# Patient Record
Sex: FEMALE | Race: OTHER | NOT HISPANIC OR LATINO | URBAN - METROPOLITAN AREA
[De-identification: names, ages, dates, MRNs, and addresses within clinical notes are randomized per-mention and may not be internally consistent; named-entity substitution may affect disease eponyms.]

---

## 2017-01-02 ENCOUNTER — INPATIENT (INPATIENT)
Facility: HOSPITAL | Age: 77
LOS: 0 days | Discharge: HOME | End: 2017-01-03
Attending: HOSPITALIST | Admitting: HOSPITALIST
Payer: MEDICARE

## 2017-01-02 VITALS
TEMPERATURE: 97 F | HEART RATE: 71 BPM | RESPIRATION RATE: 20 BRPM | SYSTOLIC BLOOD PRESSURE: 167 MMHG | DIASTOLIC BLOOD PRESSURE: 84 MMHG

## 2017-01-02 DIAGNOSIS — M54.42 LUMBAGO WITH SCIATICA, LEFT SIDE: ICD-10-CM

## 2017-01-02 DIAGNOSIS — M54.5 LOW BACK PAIN: ICD-10-CM

## 2017-01-02 DIAGNOSIS — E78.5 HYPERLIPIDEMIA, UNSPECIFIED: ICD-10-CM

## 2017-01-02 DIAGNOSIS — I10 ESSENTIAL (PRIMARY) HYPERTENSION: ICD-10-CM

## 2017-01-02 DIAGNOSIS — E03.9 HYPOTHYROIDISM, UNSPECIFIED: ICD-10-CM

## 2017-01-02 DIAGNOSIS — Z41.8 ENCOUNTER FOR OTHER PROCEDURES FOR PURPOSES OTHER THAN REMEDYING HEALTH STATE: ICD-10-CM

## 2017-01-02 LAB
BASOPHILS # BLD AUTO: 0.02 K/UL — SIGNIFICANT CHANGE UP (ref 0–0.2)
BASOPHILS NFR BLD AUTO: 0.3 % — SIGNIFICANT CHANGE UP (ref 0–2)
BUN SERPL-MCNC: 28 MG/DL — HIGH (ref 7–23)
CALCIUM SERPL-MCNC: 9.1 MG/DL — SIGNIFICANT CHANGE UP (ref 8.4–10.5)
CHLORIDE SERPL-SCNC: 101 MMOL/L — SIGNIFICANT CHANGE UP (ref 98–107)
CK SERPL-CCNC: 219 U/L — HIGH (ref 25–170)
CO2 SERPL-SCNC: 26 MMOL/L — SIGNIFICANT CHANGE UP (ref 22–31)
CREAT SERPL-MCNC: 0.9 MG/DL — SIGNIFICANT CHANGE UP (ref 0.5–1.3)
EOSINOPHIL # BLD AUTO: 0.2 K/UL — SIGNIFICANT CHANGE UP (ref 0–0.5)
EOSINOPHIL NFR BLD AUTO: 3.2 % — SIGNIFICANT CHANGE UP (ref 0–6)
GLUCOSE SERPL-MCNC: 143 MG/DL — HIGH (ref 70–99)
HCT VFR BLD CALC: 35 % — SIGNIFICANT CHANGE UP (ref 34.5–45)
HGB BLD-MCNC: 11.4 G/DL — LOW (ref 11.5–15.5)
IMM GRANULOCYTES NFR BLD AUTO: 0.2 % — SIGNIFICANT CHANGE UP (ref 0–1.5)
LYMPHOCYTES # BLD AUTO: 2.32 K/UL — SIGNIFICANT CHANGE UP (ref 1–3.3)
LYMPHOCYTES # BLD AUTO: 37.6 % — SIGNIFICANT CHANGE UP (ref 13–44)
MCHC RBC-ENTMCNC: 31.1 PG — SIGNIFICANT CHANGE UP (ref 27–34)
MCHC RBC-ENTMCNC: 32.6 % — SIGNIFICANT CHANGE UP (ref 32–36)
MCV RBC AUTO: 95.6 FL — SIGNIFICANT CHANGE UP (ref 80–100)
MONOCYTES # BLD AUTO: 0.54 K/UL — SIGNIFICANT CHANGE UP (ref 0–0.9)
MONOCYTES NFR BLD AUTO: 8.8 % — SIGNIFICANT CHANGE UP (ref 2–14)
NEUTROPHILS # BLD AUTO: 3.08 K/UL — SIGNIFICANT CHANGE UP (ref 1.8–7.4)
NEUTROPHILS NFR BLD AUTO: 49.9 % — SIGNIFICANT CHANGE UP (ref 43–77)
PLATELET # BLD AUTO: 147 K/UL — LOW (ref 150–400)
PMV BLD: 10.8 FL — SIGNIFICANT CHANGE UP (ref 7–13)
POTASSIUM SERPL-MCNC: 4.8 MMOL/L — SIGNIFICANT CHANGE UP (ref 3.5–5.3)
POTASSIUM SERPL-SCNC: 4.8 MMOL/L — SIGNIFICANT CHANGE UP (ref 3.5–5.3)
RBC # BLD: 3.66 M/UL — LOW (ref 3.8–5.2)
RBC # FLD: 12.8 % — SIGNIFICANT CHANGE UP (ref 10.3–14.5)
SODIUM SERPL-SCNC: 143 MMOL/L — SIGNIFICANT CHANGE UP (ref 135–145)
WBC # BLD: 6.17 K/UL — SIGNIFICANT CHANGE UP (ref 3.8–10.5)
WBC # FLD AUTO: 6.17 K/UL — SIGNIFICANT CHANGE UP (ref 3.8–10.5)

## 2017-01-02 PROCEDURE — 99223 1ST HOSP IP/OBS HIGH 75: CPT

## 2017-01-02 PROCEDURE — 72170 X-RAY EXAM OF PELVIS: CPT | Mod: 26

## 2017-01-02 RX ORDER — LEVOTHYROXINE SODIUM 125 MCG
75 TABLET ORAL DAILY
Qty: 0 | Refills: 0 | Status: DISCONTINUED | OUTPATIENT
Start: 2017-01-02 | End: 2017-01-03

## 2017-01-02 RX ORDER — ENOXAPARIN SODIUM 100 MG/ML
40 INJECTION SUBCUTANEOUS EVERY 24 HOURS
Qty: 0 | Refills: 0 | Status: DISCONTINUED | OUTPATIENT
Start: 2017-01-02 | End: 2017-01-03

## 2017-01-02 RX ORDER — KETOROLAC TROMETHAMINE 30 MG/ML
30 SYRINGE (ML) INJECTION ONCE
Qty: 0 | Refills: 0 | Status: DISCONTINUED | OUTPATIENT
Start: 2017-01-02 | End: 2017-01-02

## 2017-01-02 RX ORDER — TRAMADOL HYDROCHLORIDE 50 MG/1
50 TABLET ORAL EVERY 6 HOURS
Qty: 0 | Refills: 0 | Status: DISCONTINUED | OUTPATIENT
Start: 2017-01-02 | End: 2017-01-03

## 2017-01-02 RX ORDER — KETOROLAC TROMETHAMINE 30 MG/ML
60 SYRINGE (ML) INJECTION ONCE
Qty: 0 | Refills: 0 | Status: DISCONTINUED | OUTPATIENT
Start: 2017-01-02 | End: 2017-01-02

## 2017-01-02 RX ORDER — TRAMADOL HYDROCHLORIDE 50 MG/1
50 TABLET ORAL ONCE
Qty: 0 | Refills: 0 | Status: DISCONTINUED | OUTPATIENT
Start: 2017-01-02 | End: 2017-01-02

## 2017-01-02 RX ORDER — IBUPROFEN 200 MG
400 TABLET ORAL EVERY 6 HOURS
Qty: 0 | Refills: 0 | Status: DISCONTINUED | OUTPATIENT
Start: 2017-01-02 | End: 2017-01-03

## 2017-01-02 RX ORDER — ATORVASTATIN CALCIUM 80 MG/1
10 TABLET, FILM COATED ORAL AT BEDTIME
Qty: 0 | Refills: 0 | Status: DISCONTINUED | OUTPATIENT
Start: 2017-01-02 | End: 2017-01-03

## 2017-01-02 RX ADMIN — ENOXAPARIN SODIUM 40 MILLIGRAM(S): 100 INJECTION SUBCUTANEOUS at 22:39

## 2017-01-02 RX ADMIN — Medication 60 MILLIGRAM(S): at 19:39

## 2017-01-02 RX ADMIN — ATORVASTATIN CALCIUM 10 MILLIGRAM(S): 80 TABLET, FILM COATED ORAL at 22:39

## 2017-01-02 RX ADMIN — TRAMADOL HYDROCHLORIDE 50 MILLIGRAM(S): 50 TABLET ORAL at 17:53

## 2017-01-02 RX ADMIN — TRAMADOL HYDROCHLORIDE 50 MILLIGRAM(S): 50 TABLET ORAL at 19:39

## 2017-01-02 RX ADMIN — Medication 60 MILLIGRAM(S): at 18:14

## 2017-01-02 NOTE — ED PROVIDER NOTE - PHYSICAL EXAMINATION
No SI joint or back tenderness to palpation; negative CVAT b/l; negative straight leg raise test b/l; tenderness to palpation of b/l posterior thighs and buttocks without visible signs of trauma; kyphosis present. Patient able to ambulate with cane but with difficulty and pain

## 2017-01-02 NOTE — ED PROVIDER NOTE - PROGRESS NOTE DETAILS
FLORI Note: 75 yo female with PMH of HTN, HLD, chronic back and leg pain on tramadol and NSAIDS presents to the ED with one week of worsening back and leg pain and recent fall.  PE appears comfortable in bed, moving all extremities well, S1S2 RRR no murmur gallops rubs, CTABL,  on right side at posterior hip.  + straight leg raise.  Ambulates well with some antalgic fearful gait.  - follow up xrays, pain control, re-eval.  likely dispo home. MAR called by Dr. Ya before sign out.

## 2017-01-02 NOTE — ED PROVIDER NOTE - PMH
<<----- Click to add NO pertinent Past Medical History Back pain    HLD (hyperlipidemia)    HTN (hypertension)

## 2017-01-02 NOTE — ED PROVIDER NOTE - ATTENDING CONTRIBUTION TO CARE
I performed a history and physical exam of the patient and discussed their management with the resident. I reviewed the resident's note and agree with the documented findings and plan of care. My medical decison making and observations are found above.  Normal neuro exam. thigh pain after fall. pt can walk.  treat pain xray hips

## 2017-01-02 NOTE — H&P ADULT. - PROBLEM SELECTOR PLAN 2
- Monitor BP  - Continue home meds - Monitor BP overnight  - Continue home meds in am.  Patient's personal list not same as electronic record. Will need to confirm with pcp

## 2017-01-02 NOTE — H&P ADULT. - HISTORY OF PRESENT ILLNESS
77 yo F with h/o HTN, HLD, hypothyroid, obese with chronic back pain p/w acutely worsened back and hip pain. Pt has had chronic back and hip pain for many years but it has been getting worse over the past two weeks and especially bad this morning when getting out of bed and pt could not stand on her own due to pain- states that she slid to the floor without and LOC or head trauma. She describes her back pain as sharp radiating down both legs, also has "pulling and sharp pain" in buttocks and hip which is worse with ambulation. She has been tapering down on her tramadol use on recommendation from her PCP but states that pain was still not controlled after taking tramadol. She denies any recent trauma or lifting heavy objects. No fevers or weight loss. She has had b/l knee replacements for osteoarthritis. She also mentions gaining weight      ED VS: 167/84  71  97.4  20 100% on RA  Pt was given valium 2mg PO, tramadol 60mg IM, percocet, tramadol 50mg PO

## 2017-01-02 NOTE — ED PROVIDER NOTE - MEDICAL DECISION MAKING DETAILS
75 yo female with PMH of HTN, HLD, chronic back and leg pain on tramadol and NSAIDS at home presents to the ED with one week of worsening back and leg pain since her doctor tapered her off of tramadol. Plan: pelvic xray, pain control, reevaluation 77 yo female with PMH of HTN, HLD, chronic back and leg pain on tramadol and NSAIDS at home presents to the ED with one week of worsening back and leg pain since her doctor tapered her off of tramadol. Plan: pelvic xray, pain control, reevaluation  Manuelito: no neuro changes. fall not syncopal or presyncopal.  no head trauma. will xray for trauma. discuss pain control with patient.

## 2017-01-02 NOTE — H&P ADULT. - PROBLEM SELECTOR PLAN 1
- Back pain likely sciatica  - Patient also having b/l hip pain likely due to osteoarthritis  - Pain control- NSAID, tramadol prn  - Consult PT, likely to benefit as out pt as well  - orthopedic surgery consult  -  on weight reduction - Back pain likely sciatica  - Patient also having b/l hip pain likely due to osteoarthritis  - Pain control- morphine prn- pt states that percocet and tramadol were not effective  - Consult PT, likely to benefit as out pt as well  - Consider pain management consult  - orthopedic surgery consult

## 2017-01-02 NOTE — ED PROVIDER NOTE - OBJECTIVE STATEMENT
75 yo female with PMH of HTN, HLD, chronic back and leg pain on tramadol and NSAIDS at home presents to the ED with one week of worsening back and leg pain since her doctor tapered her off of tramadol. States she had a witnessed mechanical fall from standing height at home today due to lower extremity pain and weakness. Denies LOC, head or neck trauma, n/v, fever, chills, paresthesias or numbness. Moving all extremities spontaneously, able to ambulate with her cane but with difficulty.

## 2017-01-03 VITALS
DIASTOLIC BLOOD PRESSURE: 83 MMHG | OXYGEN SATURATION: 100 % | HEART RATE: 76 BPM | SYSTOLIC BLOOD PRESSURE: 158 MMHG | TEMPERATURE: 98 F | RESPIRATION RATE: 19 BRPM

## 2017-01-03 PROCEDURE — 99239 HOSP IP/OBS DSCHRG MGMT >30: CPT

## 2017-01-03 RX ORDER — MORPHINE SULFATE 50 MG/1
2 CAPSULE, EXTENDED RELEASE ORAL EVERY 4 HOURS
Qty: 0 | Refills: 0 | Status: DISCONTINUED | OUTPATIENT
Start: 2017-01-03 | End: 2017-01-03

## 2017-01-03 RX ORDER — ACETAMINOPHEN 500 MG
650 TABLET ORAL EVERY 6 HOURS
Qty: 0 | Refills: 0 | Status: DISCONTINUED | OUTPATIENT
Start: 2017-01-03 | End: 2017-01-03

## 2017-01-03 RX ORDER — DOCUSATE SODIUM 100 MG
1 CAPSULE ORAL
Qty: 90 | Refills: 0 | OUTPATIENT
Start: 2017-01-03 | End: 2017-02-02

## 2017-01-03 RX ORDER — POLYETHYLENE GLYCOL 3350 17 G/17G
17 POWDER, FOR SOLUTION ORAL DAILY
Qty: 0 | Refills: 0 | Status: DISCONTINUED | OUTPATIENT
Start: 2017-01-03 | End: 2017-01-03

## 2017-01-03 RX ORDER — POLYETHYLENE GLYCOL 3350 17 G/17G
17 POWDER, FOR SOLUTION ORAL
Qty: 510 | Refills: 0 | OUTPATIENT
Start: 2017-01-03 | End: 2017-02-02

## 2017-01-03 RX ORDER — OXYCODONE HYDROCHLORIDE 5 MG/1
5 TABLET ORAL EVERY 6 HOURS
Qty: 0 | Refills: 0 | Status: DISCONTINUED | OUTPATIENT
Start: 2017-01-03 | End: 2017-01-03

## 2017-01-03 RX ORDER — DOCUSATE SODIUM 100 MG
100 CAPSULE ORAL THREE TIMES A DAY
Qty: 0 | Refills: 0 | Status: DISCONTINUED | OUTPATIENT
Start: 2017-01-03 | End: 2017-01-03

## 2017-01-03 RX ORDER — MORPHINE SULFATE 50 MG/1
4 CAPSULE, EXTENDED RELEASE ORAL EVERY 4 HOURS
Qty: 0 | Refills: 0 | Status: DISCONTINUED | OUTPATIENT
Start: 2017-01-03 | End: 2017-01-03

## 2017-01-03 RX ORDER — LOSARTAN POTASSIUM 100 MG/1
100 TABLET, FILM COATED ORAL ONCE
Qty: 0 | Refills: 0 | Status: COMPLETED | OUTPATIENT
Start: 2017-01-03 | End: 2017-01-03

## 2017-01-03 RX ADMIN — Medication 75 MICROGRAM(S): at 05:25

## 2017-01-03 RX ADMIN — Medication 100 MILLIGRAM(S): at 13:04

## 2017-01-03 RX ADMIN — LOSARTAN POTASSIUM 100 MILLIGRAM(S): 100 TABLET, FILM COATED ORAL at 13:04

## 2017-01-03 RX ADMIN — OXYCODONE HYDROCHLORIDE 5 MILLIGRAM(S): 5 TABLET ORAL at 18:45

## 2017-01-03 RX ADMIN — POLYETHYLENE GLYCOL 3350 17 GRAM(S): 17 POWDER, FOR SOLUTION ORAL at 13:05

## 2017-01-03 NOTE — DISCHARGE NOTE ADULT - PATIENT PORTAL LINK FT
“You can access the Follow[facility] Patient Portal, offered by [facility], by registering with the following website: [email or web address]”

## 2017-01-03 NOTE — DISCHARGE NOTE ADULT - CONDITIONS AT DISCHARGE
Patient is alert and orientedx4,vss no c/o pain no respiratory distress. Discharge home, discharge instructions reviewed with patient , copies given Verbalized understanding of discharge instructions and follow-up care

## 2017-01-03 NOTE — DISCHARGE NOTE ADULT - PLAN OF CARE
pain control Continue pain medications as needed.  Please follow up with your PCP within 1 week.   Call your doctor if pain worsens.

## 2017-01-03 NOTE — DISCHARGE NOTE ADULT - MEDICATION SUMMARY - MEDICATIONS TO TAKE
I will START or STAY ON the medications listed below when I get home from the hospital:    traMADol 50 mg oral tablet  -- 1 tab(s) by mouth every 6 hours, As Needed  -- Indication: For Acute bilateral low back pain with bilateral sciatica    pravastatin 40 mg oral tablet  -- 1 tab(s) by mouth once a day  -- Indication: For Hyperlipidemia, unspecified hyperlipidemia type    Hyzaar 100 mg-12.5 mg oral tablet  -- 1 tab(s) by mouth once a day  -- Indication: For HTN (hypertension)    Ventolin HFA 90 mcg/inh inhalation aerosol  -- 2 puff(s) inhaled 4 times a day, As Needed  -- Indication: For Asthma    docusate sodium 100 mg oral capsule  -- 1 cap(s) by mouth 3 times a day  -- Indication: For Constipation    polyethylene glycol 3350 oral powder for reconstitution  -- 17 gram(s) by mouth once a day  -- Indication: For Constipation    Synthroid 75 mcg (0.075 mg) oral tablet  -- 1 tab(s) by mouth once a day  -- Indication: For Hypothyroidism, unspecified type

## 2017-01-03 NOTE — DISCHARGE NOTE ADULT - HOSPITAL COURSE
75 yo F with h/o HTN, HLD, hypothyroid, obese with chronic back pain p/w acutely worsened back and hip pain.  Pain control obtained.   PT came to eval patient - recommended     dispo: 75 yo F with h/o HTN, HLD, hypothyroid, obese with chronic back pain p/w acutely worsened back and hip pain. Xray pelvis negative.  Pain control obtained. Pt improved.  Medically optimized for discharge.  PT came to eval patient - recommended     dispo: to home 75 yo F with h/o HTN, HLD, hypothyroid, obese with chronic back pain p/w acutely worsened back and hip pain. Xray pelvis negative.  Pain control obtained. Pt improved.  Medically optimized for discharge.  PT came to eval patient - recommended no needs, pt has own cane    dispo: to home 75 yo F with h/o HTN, HLD, hypothyroid, obese with chronic back pain p/w acutely worsened back and hip pain. Xray pelvis negative.  Pain control obtained. Pt improved.  Medically optimized for discharge.  PT came to eval patient - recommended no needs, pt has own cane.

## 2017-01-03 NOTE — DISCHARGE NOTE ADULT - CARE PROVIDER_API CALL
Patti Villanueva), Medicine  [address] [place] [address]  Phone: [phone]  Fax: [phone]

## 2017-01-03 NOTE — DISCHARGE NOTE ADULT - CARE PLAN
Principal Discharge DX:	Acute bilateral low back pain with bilateral sciatica  Goal:	pain control  Instructions for follow-up, activity and diet:	Continue pain medications as needed.  Please follow up with your PCP within 1 week.   Call your doctor if pain worsens.

## 2018-12-08 NOTE — ED PROVIDER NOTE - SKIN, MLM
Progress Notes by Alon Irving PT at 06/14/17 01:10 PM     Author:  Alon Irving PT Service:  (none) Author Type:  Physical Therapist     Filed:  06/14/17 01:50 PM Encounter Date:  6/14/2017 Status:  Signed     :  Alon Irving PT (Physical Therapist)            MEDICARE  PHYSICAL THERAPY DAILY PROGRESS NOTE  DIAGNOSIS:    1. Chronic right shoulder pain           INSURANCE  BENEFITS: Patient has used $0 towards the Physical Therapy and Occupational Therapy cap in 2017 calendar year    PHYSICIAN RECOMMENDATIONS: Evaluate and Treat     ATTENDANCE: Patient has been seen for[AS1.1C] 4[AS1.1M] visits between 6/5/2017 and[AS1.1C]  6/14/2017[AS1.1T]. Progress summary due on or before 10th visit with G-codes. SUBJECTIVE:  Patient reports[AS1.1C] she is a little sore but pain has went down at least 50%; she used to have so much pain it brought her to tears.   Using the right arm to put dishes away with not a lot of pain.[AS1.1M]    OBJECTIVE:     Observation/Posture: Bilateral scapular protraction    Range of Motion:   Active shoulder range of motion in degrees (*=pain):   Right  6/5/2017  Left  6/5/2017[AS1.1C]  Right[AS1.1M]  6/14/2017[AS1.2T]   Flexion 115 160[AS1.1C] 140[AS1.1M]   Abduction 75* 180[AS1.1C] 90*[AS1.1M]   External Rotation Behind head T3    Internal Rotation L5 T7      Passive shoulder range of motion in degrees (*=pain):   Right  6/5/2017    Flexion 90-  guarding   Abduction 80*  guarding   External Rotation 30*   Internal Rotation 40*       Manual Muscle Test: Shoulder Strength (*=pain):   Right  6/5/2017  Left  6/5/2017    Flexion 4/5 5/5   Abduction 4-/5 5/5   Horizontal Abduction 4-/5 4+/5   Horizontal Adduction 5-/5 5/5   External Rotation 4+/5 5/5   Internal Rotation 5-/5 5/5          Palpation: tenderness at biceps tendon, supraspinatus, pectoralis minor, subscapularis    Joint Mobility: Acromioclavicular joint: restrictions posterior/inferior, Glenohumeral: posterior, inferior hypomobility     Special tests:  Aguilar-Parish test right: Positive  Empty can test right: Positive      Functional Assessment:   Shoulder elevation with reaching >90 degrees  Scapular protraction through out reaching     FUNCTIONAL LIMITATIONS REPORTING:  Carrying, Moving & Handling Objects -  Current Status -  - CL - 60-79% and Projected Goal -  - CI - 1-19%    TREATMENT TODAY:    Time in: 1:0[AS1.1C]6[AS1.1M]    Time out:  2:0[AS1.1C]1[AS1.1M]    Modalities - Electrical Stimulation to decrease pain, reduce inflammation, decrease edema, improve circulation and increase tissue extensibility:  41534 (attended) x 1 units - 15 minutes  06793 (non-wound unattended Medicare)      Manual Therapy to increase joint mobility, improve circulation,  increase range of motion, decrease edema, decrease myofascial tightness, improve soft tissue and joint mobility and attain neutral alignment:  97140 x 1 units -[AS1.1C]  17[AS1.1M] minutes  grade 2-3 posterior glenohumeral mobilization  Scapular mobilization all planes  Release to anterior capsule at glenohumeral joint, upper trapezius, daniel scapula    Therapeutic Exercise to increase range of motion, improve flexibility, improve balance, increase strength and instruct in a home exercise program:  16665 x[AS1.1C] 2[AS1.1M] units -  23 minutes  Retro arm bike 3minutes  Side lying external rotation 10x 1lb, abduction 10x   standing with swiss ball rolling up wall 3seconds x10[AS1.1C] add lift off[AS1.1M]  Cane supine external rotation 5seconds x10  Seated cervical upper trapezius stretch [id number]  Seated pulley's  Horizontal abduction, adduction stretches at railing [id number]   Ball depression  20x  Bent over row 2lbs, T 1lb 10x each[AS1.1C]    add:  Cone stacking[AS1.1M]      Home exercise program (last updated 6/5/2017): Patient issued written home exercise program.  Patient demonstrated exercises correctly and was instructed to call with questions.    Side lying external rotation 10x  Seated towel flexion slides 10x  Cane supine external rotation 5seconds x10  Seated cervical upper trapezius stretch [id number]  ASSESSMENT/TREATMENT RESPONSE:    Patient's response to treatment:[AS1.1C] Improved mobility overhead with minimal pain and increased functional use. Still demo's pain with abduction with tightness at superior glenohumeral and weakness in scapula. Continue to progress strengthening as able.[AS1.1M]   Functional improvement noted: Reports[AS1.1C] 50% reduction in pain per patient; able to put a cup/plate PFIM[MR8.8X]    Prognosis for meeting goals:  good. Treatment will consist of electrical stimulation, home program, manual therapy, neuromuscular re-education, Physical therapy evaluation and therapeutic exercise. Treatment plan was discussed with the patient and verbal consent was obtained. Short Term Goals (to be achieved in 4 weeks)  1. Patient will be able to groom hair by reaching overhead with no more than 4/10 pain - MET 6/12/2017  2. Patient will report increase in symptoms with home program exercises  3. Patient will report no more than 2x waking due to pain- MET 6/12/2017  4. Patient will demonstrate correct seated posture without cueing     Long Term Goals (to be achieved in 8 weeks)  1. Patient will be able to lay on right side for >20 minutes to assist in sleeping through night  2. Patient will reach to T7 to assist in upper and lower body dressing  3. Patient will reach 150 degrees to first shelf with 3lbs to simulate cleaning and reaching tasks for home chores  4. Patient will demonstrate at least 4+/5 daniel scapular and rotator cuff strengthening for activities of daily living  5. Patient will be independent with home program exercises    PLAN:   Patient will be seen 2 times per week for 8 weeks. Certification Dates:  Medicare certification signed from: 6/5/2017 to 9/3/2017.     Therapist Signature:[AS1.1C] Electronically Signed by:    Beatrice Araujo Shon Bautista, PT , 6/14/2017[AS1.1T]                Revision History        User Key Date/Time User Provider Type Action    > AS1.2 06/14/17 01:50 PM Mona Villalpando PT Physical Therapist Sign     AS1.1 06/14/17 01:10 PM Mona Villalpando PT Physical Therapist     C - Copied, M - Manual, T - Template Skin normal color for race, warm, dry and intact. No evidence of rash.

## 2019-03-24 ENCOUNTER — INPATIENT (INPATIENT)
Facility: HOSPITAL | Age: 79
LOS: 3 days | Discharge: HOME | DRG: 194 | End: 2019-03-28
Attending: HOSPITALIST | Admitting: INTERNAL MEDICINE
Payer: MEDICARE

## 2019-03-24 VITALS
HEART RATE: 81 BPM | WEIGHT: 220.02 LBS | RESPIRATION RATE: 18 BRPM | SYSTOLIC BLOOD PRESSURE: 96 MMHG | HEIGHT: 65 IN | OXYGEN SATURATION: 98 % | TEMPERATURE: 98 F | DIASTOLIC BLOOD PRESSURE: 76 MMHG

## 2019-03-24 DIAGNOSIS — J18.9 PNEUMONIA, UNSPECIFIED ORGANISM: ICD-10-CM

## 2019-03-24 LAB
ALBUMIN SERPL ELPH-MCNC: 3.5 G/DL — SIGNIFICANT CHANGE UP (ref 3.3–5)
ALP SERPL-CCNC: 55 U/L — SIGNIFICANT CHANGE UP (ref 40–120)
ALT FLD-CCNC: 27 U/L — SIGNIFICANT CHANGE UP (ref 10–45)
ANION GAP SERPL CALC-SCNC: 13 MMOL/L — SIGNIFICANT CHANGE UP (ref 5–17)
ANION GAP SERPL CALC-SCNC: 14 MMOL/L — SIGNIFICANT CHANGE UP (ref 5–17)
APTT BLD: 31.3 SEC — SIGNIFICANT CHANGE UP (ref 27.5–36.3)
AST SERPL-CCNC: 75 U/L — HIGH (ref 10–40)
BASOPHILS # BLD AUTO: 0 K/UL — SIGNIFICANT CHANGE UP (ref 0–0.2)
BASOPHILS NFR BLD AUTO: 0.3 % — SIGNIFICANT CHANGE UP (ref 0–2)
BILIRUB SERPL-MCNC: 0.1 MG/DL — LOW (ref 0.2–1.2)
BUN SERPL-MCNC: 46 MG/DL — HIGH (ref 7–23)
BUN SERPL-MCNC: 48 MG/DL — HIGH (ref 7–23)
CALCIUM SERPL-MCNC: 8.9 MG/DL — SIGNIFICANT CHANGE UP (ref 8.4–10.5)
CALCIUM SERPL-MCNC: 8.9 MG/DL — SIGNIFICANT CHANGE UP (ref 8.4–10.5)
CHLORIDE SERPL-SCNC: 101 MMOL/L — SIGNIFICANT CHANGE UP (ref 96–108)
CHLORIDE SERPL-SCNC: 99 MMOL/L — SIGNIFICANT CHANGE UP (ref 96–108)
CO2 SERPL-SCNC: 25 MMOL/L — SIGNIFICANT CHANGE UP (ref 22–31)
CO2 SERPL-SCNC: 26 MMOL/L — SIGNIFICANT CHANGE UP (ref 22–31)
CREAT SERPL-MCNC: 1.56 MG/DL — HIGH (ref 0.5–1.3)
CREAT SERPL-MCNC: 1.6 MG/DL — HIGH (ref 0.5–1.3)
EOSINOPHIL # BLD AUTO: 0.1 K/UL — SIGNIFICANT CHANGE UP (ref 0–0.5)
EOSINOPHIL NFR BLD AUTO: 1.6 % — SIGNIFICANT CHANGE UP (ref 0–6)
GAS PNL BLDV: SIGNIFICANT CHANGE UP
GLUCOSE SERPL-MCNC: 134 MG/DL — HIGH (ref 70–99)
GLUCOSE SERPL-MCNC: 138 MG/DL — HIGH (ref 70–99)
HCT VFR BLD CALC: 42.6 % — SIGNIFICANT CHANGE UP (ref 34.5–45)
HGB BLD-MCNC: 13.5 G/DL — SIGNIFICANT CHANGE UP (ref 11.5–15.5)
INR BLD: 0.88 RATIO — SIGNIFICANT CHANGE UP (ref 0.88–1.16)
LYMPHOCYTES # BLD AUTO: 1.5 K/UL — SIGNIFICANT CHANGE UP (ref 1–3.3)
LYMPHOCYTES # BLD AUTO: 37.7 % — SIGNIFICANT CHANGE UP (ref 13–44)
MAGNESIUM SERPL-MCNC: 2.3 MG/DL — SIGNIFICANT CHANGE UP (ref 1.6–2.6)
MCHC RBC-ENTMCNC: 30.5 PG — SIGNIFICANT CHANGE UP (ref 27–34)
MCHC RBC-ENTMCNC: 31.7 GM/DL — LOW (ref 32–36)
MCV RBC AUTO: 96.3 FL — SIGNIFICANT CHANGE UP (ref 80–100)
MONOCYTES # BLD AUTO: 0.6 K/UL — SIGNIFICANT CHANGE UP (ref 0–0.9)
MONOCYTES NFR BLD AUTO: 15.9 % — HIGH (ref 2–14)
NEUTROPHILS # BLD AUTO: 1.7 K/UL — LOW (ref 1.8–7.4)
NEUTROPHILS NFR BLD AUTO: 44.5 % — SIGNIFICANT CHANGE UP (ref 43–77)
NT-PROBNP SERPL-SCNC: 216 PG/ML — SIGNIFICANT CHANGE UP (ref 0–300)
PLATELET # BLD AUTO: 141 K/UL — LOW (ref 150–400)
POTASSIUM SERPL-MCNC: 4.9 MMOL/L — SIGNIFICANT CHANGE UP (ref 3.5–5.3)
POTASSIUM SERPL-MCNC: 5.6 MMOL/L — HIGH (ref 3.5–5.3)
POTASSIUM SERPL-SCNC: 4.9 MMOL/L — SIGNIFICANT CHANGE UP (ref 3.5–5.3)
POTASSIUM SERPL-SCNC: 5.6 MMOL/L — HIGH (ref 3.5–5.3)
PROCALCITONIN SERPL-MCNC: 0.16 NG/ML — HIGH (ref 0.02–0.1)
PROT SERPL-MCNC: 7 G/DL — SIGNIFICANT CHANGE UP (ref 6–8.3)
PROTHROM AB SERPL-ACNC: 10.1 SEC — SIGNIFICANT CHANGE UP (ref 10–12.9)
RBC # BLD: 4.42 M/UL — SIGNIFICANT CHANGE UP (ref 3.8–5.2)
RBC # FLD: 12.6 % — SIGNIFICANT CHANGE UP (ref 10.3–14.5)
SODIUM SERPL-SCNC: 138 MMOL/L — SIGNIFICANT CHANGE UP (ref 135–145)
SODIUM SERPL-SCNC: 140 MMOL/L — SIGNIFICANT CHANGE UP (ref 135–145)
TROPONIN T, HIGH SENSITIVITY RESULT: 31 NG/L — SIGNIFICANT CHANGE UP (ref 0–51)
TROPONIN T, HIGH SENSITIVITY RESULT: 32 NG/L — SIGNIFICANT CHANGE UP (ref 0–51)
WBC # BLD: 3.9 K/UL — SIGNIFICANT CHANGE UP (ref 3.8–10.5)
WBC # FLD AUTO: 3.9 K/UL — SIGNIFICANT CHANGE UP (ref 3.8–10.5)

## 2019-03-24 PROCEDURE — 93010 ELECTROCARDIOGRAM REPORT: CPT

## 2019-03-24 PROCEDURE — 99285 EMERGENCY DEPT VISIT HI MDM: CPT | Mod: 25

## 2019-03-24 PROCEDURE — 71046 X-RAY EXAM CHEST 2 VIEWS: CPT | Mod: 26

## 2019-03-24 PROCEDURE — 71250 CT THORAX DX C-: CPT | Mod: 26

## 2019-03-24 RX ORDER — SODIUM CHLORIDE 9 MG/ML
500 INJECTION INTRAMUSCULAR; INTRAVENOUS; SUBCUTANEOUS ONCE
Qty: 0 | Refills: 0 | Status: COMPLETED | OUTPATIENT
Start: 2019-03-24 | End: 2019-03-24

## 2019-03-24 RX ORDER — SODIUM CHLORIDE 9 MG/ML
1000 INJECTION INTRAMUSCULAR; INTRAVENOUS; SUBCUTANEOUS ONCE
Qty: 0 | Refills: 0 | Status: COMPLETED | OUTPATIENT
Start: 2019-03-24 | End: 2019-03-24

## 2019-03-24 RX ORDER — CEFTRIAXONE 500 MG/1
1 INJECTION, POWDER, FOR SOLUTION INTRAMUSCULAR; INTRAVENOUS ONCE
Qty: 0 | Refills: 0 | Status: COMPLETED | OUTPATIENT
Start: 2019-03-24 | End: 2019-03-24

## 2019-03-24 RX ORDER — AZITHROMYCIN 500 MG/1
500 TABLET, FILM COATED ORAL ONCE
Qty: 0 | Refills: 0 | Status: COMPLETED | OUTPATIENT
Start: 2019-03-24 | End: 2019-03-24

## 2019-03-24 RX ADMIN — SODIUM CHLORIDE 1000 MILLILITER(S): 9 INJECTION INTRAMUSCULAR; INTRAVENOUS; SUBCUTANEOUS at 22:07

## 2019-03-24 RX ADMIN — CEFTRIAXONE 100 GRAM(S): 500 INJECTION, POWDER, FOR SOLUTION INTRAMUSCULAR; INTRAVENOUS at 23:20

## 2019-03-24 NOTE — ED ADULT NURSE NOTE - OBJECTIVE STATEMENT
79 y/o female history of HLD and HTN presents to the ED  from home c/o cough. Patient states that she has had a cough for the past 3-4 days. Patient also notes that she has been feeling SOB. Patient states that she has been coughing up brown sputum. Patient complains of left sided chest pain that radiates to the back when she breathes in.  Denies fever, chills, n/v, weakness, abd pain, diarrhea/constipation, numbness/tingling, urinary s/s. 77 y/o female history of HLD and HTN presents to the ED  from home c/o cough. Patient states that she has had a cough for the past 3-4 days. Patient also notes that she has been feeling SOB. Patient states that she has been coughing up brown sputum. Patient complains of left sided chest pain that radiates to the back when she breathes in. Denies fever, chills, n/v, weakness, abd pain, diarrhea/constipation, numbness/tingling, urinary s/s. Patient A&Ox3. Patient has rhonchi in the left lower lobe. Strong peripheral pulses.

## 2019-03-24 NOTE — ED PROVIDER NOTE - ATTENDING CONTRIBUTION TO CARE
Patient presenting with 4 days of productive cough and shortness of breath.  Subjective fevers/chills at home.  No known sick contacts.    A 14 point review of systems is negative except as in HPI or otherwise documented.    Exam:  General: Patient well appearing, hypotensive but in no distress otherwise vital signs within normal limits  HEENT: airway patent with moist mucous membranes  Cardiac: RRR S1/S2 with strong peripheral pulses  Respiratory: bilateral rhonchi in mid lung fields R > L  GI: abdomen soft, non tender, non distended  Neuro: no gross neurologic deficits  Skin: warm, well perfused  Psych: normal mood and affect    Elderly woman with four days of productive cough and positive lung findings on exam concerning for pulmonary infection - viral versus PNA - plan for labs, RVP, CXR

## 2019-03-24 NOTE — ED ADULT NURSE NOTE - NSIMPLEMENTINTERV_GEN_ALL_ED
Implemented All Universal Safety Interventions:  [place] to call system. Call bell, personal items and telephone within reach. Instruct patient to call for assistance. Room bathroom lighting operational. Non-slip footwear when patient is off stretcher. Physically safe environment: no spills, clutter or unnecessary equipment. Stretcher in lowest position, wheels locked, appropriate side rails in place.

## 2019-03-24 NOTE — ED PROVIDER NOTE - OBJECTIVE STATEMENT
79 y/o female PMHx HTN, HLD, hypothyroidism presented to the ED c/o productive cough with brown sputum and SOB x4 days. Patient stated she feels COELHO walking within a few feet and has left sided chest pain that radiates to the back. Used Bengay for back pain. Patient has subjective fevers and chills. Took mucinex for productive cough with minimal relief of symptoms. Patient had flu shot this year and denied any close sick contacts. Patient unknown last stress test. Patient denied N/V/D, abdominal pain, rashes, headache, neck pain

## 2019-03-25 DIAGNOSIS — Z29.9 ENCOUNTER FOR PROPHYLACTIC MEASURES, UNSPECIFIED: ICD-10-CM

## 2019-03-25 DIAGNOSIS — I10 ESSENTIAL (PRIMARY) HYPERTENSION: ICD-10-CM

## 2019-03-25 DIAGNOSIS — E78.5 HYPERLIPIDEMIA, UNSPECIFIED: ICD-10-CM

## 2019-03-25 DIAGNOSIS — E03.9 HYPOTHYROIDISM, UNSPECIFIED: ICD-10-CM

## 2019-03-25 DIAGNOSIS — J11.1 INFLUENZA DUE TO UNIDENTIFIED INFLUENZA VIRUS WITH OTHER RESPIRATORY MANIFESTATIONS: ICD-10-CM

## 2019-03-25 DIAGNOSIS — N17.9 ACUTE KIDNEY FAILURE, UNSPECIFIED: ICD-10-CM

## 2019-03-25 PROBLEM — M54.9 DORSALGIA, UNSPECIFIED: Chronic | Status: ACTIVE | Noted: 2017-01-02

## 2019-03-25 LAB
FLUAV H1 2009 PAND RNA SPEC QL NAA+PROBE: DETECTED
FLUAV SUBTYP SPEC NAA+PROBE: DETECTED
RAPID RVP RESULT: DETECTED

## 2019-03-25 PROCEDURE — 99223 1ST HOSP IP/OBS HIGH 75: CPT | Mod: GC,AI

## 2019-03-25 RX ORDER — ACETAMINOPHEN 500 MG
650 TABLET ORAL EVERY 6 HOURS
Qty: 0 | Refills: 0 | Status: DISCONTINUED | OUTPATIENT
Start: 2019-03-25 | End: 2019-03-28

## 2019-03-25 RX ORDER — ATORVASTATIN CALCIUM 80 MG/1
10 TABLET, FILM COATED ORAL AT BEDTIME
Qty: 0 | Refills: 0 | Status: DISCONTINUED | OUTPATIENT
Start: 2019-03-25 | End: 2019-03-28

## 2019-03-25 RX ORDER — ALBUTEROL 90 UG/1
1 AEROSOL, METERED ORAL EVERY 4 HOURS
Qty: 0 | Refills: 0 | Status: DISCONTINUED | OUTPATIENT
Start: 2019-03-25 | End: 2019-03-28

## 2019-03-25 RX ORDER — SODIUM CHLORIDE 9 MG/ML
1000 INJECTION INTRAMUSCULAR; INTRAVENOUS; SUBCUTANEOUS
Qty: 0 | Refills: 0 | Status: DISCONTINUED | OUTPATIENT
Start: 2019-03-25 | End: 2019-03-28

## 2019-03-25 RX ORDER — BUDESONIDE, MICRONIZED 100 %
0.5 POWDER (GRAM) MISCELLANEOUS
Qty: 0 | Refills: 0 | Status: DISCONTINUED | OUTPATIENT
Start: 2019-03-25 | End: 2019-03-28

## 2019-03-25 RX ORDER — LEVOTHYROXINE SODIUM 125 MCG
75 TABLET ORAL DAILY
Qty: 0 | Refills: 0 | Status: DISCONTINUED | OUTPATIENT
Start: 2019-03-25 | End: 2019-03-28

## 2019-03-25 RX ORDER — TIOTROPIUM BROMIDE 18 UG/1
1 CAPSULE ORAL; RESPIRATORY (INHALATION) DAILY
Qty: 0 | Refills: 0 | Status: DISCONTINUED | OUTPATIENT
Start: 2019-03-25 | End: 2019-03-28

## 2019-03-25 RX ORDER — IPRATROPIUM/ALBUTEROL SULFATE 18-103MCG
3 AEROSOL WITH ADAPTER (GRAM) INHALATION EVERY 6 HOURS
Qty: 0 | Refills: 0 | Status: COMPLETED | OUTPATIENT
Start: 2019-03-25 | End: 2019-03-25

## 2019-03-25 RX ADMIN — Medication 3 MILLILITER(S): at 17:53

## 2019-03-25 RX ADMIN — Medication 3 MILLILITER(S): at 23:30

## 2019-03-25 RX ADMIN — SODIUM CHLORIDE 250 MILLILITER(S): 9 INJECTION INTRAMUSCULAR; INTRAVENOUS; SUBCUTANEOUS at 00:32

## 2019-03-25 RX ADMIN — Medication 3 MILLILITER(S): at 12:10

## 2019-03-25 RX ADMIN — Medication 3 MILLILITER(S): at 05:48

## 2019-03-25 RX ADMIN — SODIUM CHLORIDE 75 MILLILITER(S): 9 INJECTION INTRAMUSCULAR; INTRAVENOUS; SUBCUTANEOUS at 13:40

## 2019-03-25 RX ADMIN — AZITHROMYCIN 250 MILLIGRAM(S): 500 TABLET, FILM COATED ORAL at 00:49

## 2019-03-25 RX ADMIN — ATORVASTATIN CALCIUM 10 MILLIGRAM(S): 80 TABLET, FILM COATED ORAL at 23:29

## 2019-03-25 NOTE — H&P ADULT - NSHPPOAPULMEMBOLUS_GEN_A_CORE
Ventricular Rate : 137   Atrial Rate : 137   P-R Interval : 140   QRS Duration : 74   Q-T Interval : 264   QTC Calculation(Bezet) : 398   P Axis : 64   R Axis : 51   T Axis : 11   Diagnosis : Sinus tachycardia~Possible Left atrial enlargement~Nonspecific T wave abnormality~Abnormal ECG~No previous ECGs available~Confirmed by Марина Kuhn MD ([id number]) on 7/24/2017 11:14:26 PM     
no

## 2019-03-25 NOTE — H&P ADULT - PROBLEM SELECTOR PLAN 2
Patient with Cr of 1.6 on admission with unclear baseline  - IVF   - Strict I/Os  - Renally dose nephrotoxic medications

## 2019-03-25 NOTE — H&P ADULT - NSHPLABSRESULTS_GEN_ALL_CORE
13.5   3.9   )-----------( 141      ( 24 Mar 2019 22:15 )             42.6     03-24    138  |  99  |  48<H>  ----------------------------<  138<H>  4.9   |  25  |  1.56<H>    Ca    8.9      24 Mar 2019 23:18  Mg     2.3     03-24    TPro  7.0  /  Alb  3.5  /  TBili  0.1<L>  /  DBili  x   /  AST  75<H>  /  ALT  27  /  AlkPhos  55  03-24        LIVER FUNCTIONS - ( 24 Mar 2019 22:15 )  Alb: 3.5 g/dL / Pro: 7.0 g/dL / ALK PHOS: 55 U/L / ALT: 27 U/L / AST: 75 U/L / GGT: x           PT/INR - ( 24 Mar 2019 22:15 )   PT: 10.1 sec;   INR: 0.88 ratio         PTT - ( 24 Mar 2019 22:15 )  PTT:31.3 sec                                  EKG: NSR    RADIOLOGY STUDIES:  < from: Xray Chest 2 Views PA/Lat (03.24.19 @ 22:58) >    IMPRESSION:   Cardiomegaly. Clear lungs.    < from: CT Chest No Cont (03.24.19 @ 23:37) >      IMPRESSION:     No focal consolidation.    Nonspecific left upper lobe nodule If the patient is at low risk for   malignancy, no further follow-up is needed. If the patient is at high   risk, 12 month follow-up CT chest may be obtained for further evaluation.    < end of copied text >

## 2019-03-25 NOTE — H&P ADULT - NSHPPHYSICALEXAM_GEN_ALL_CORE
GENERAL: NAD, well-developed, SOB  HEAD:  Atraumatic, Normocephalic  EYES: PERRL. EOMI,  conjunctiva and sclera clear.  NECK: Supple  CHEST/LUNG: Significant wheezing heard bilaterally  HEART: Regular rate and rhythm. Normal sounding S1, S2. No murmurs, rubs, or gallops  ABDOMEN: Soft, nontender, nondistended; Bowel sounds present.  EXTREMITIES:  No clubbing, cyanosis, or edema. Peripheral pulses 2+ and symmetric.  PSYCH: AAOx3  NEUROLOGY: non-focal  SKIN: No rashes or lesions

## 2019-03-25 NOTE — H&P ADULT - ASSESSMENT
78F with PMH of HTN, HLD, hypothyroidism presenting with 4 days of non-productive cough and SOB found to have influenza.

## 2019-03-25 NOTE — H&P ADULT - PROBLEM SELECTOR PLAN 1
Patient with 4 days of non-productive cough and shortness of breath, found to be Influenza A positive. No leukocytosis, but lactate of 2.1. Patient outside window for tamiflu.  - Duonebs for SOB  - Droplet isolation  - Tylenol prn Patient with 4 days of non-productive cough and shortness of breath, found to be Influenza A positive. No leukocytosis, but lactate of 2.1. Patient outside window for tamiflu.  -CXR/CT chest clear  - Duonebs for SOB  - Droplet isolation  - Tylenol prn  - IVF Patient with 4 days of non-productive cough and shortness of breath, found to be Influenza A positive. No leukocytosis, but lactate of 2.1. Patient outside window for tamiflu.  -CXR/CT chest clear  - Duonebs, inhaled and systemic steroid short course  - Droplet isolation  - Tylenol prn

## 2019-03-25 NOTE — H&P ADULT - HISTORY OF PRESENT ILLNESS
78F with PMH of HTN, HLD, hypothyroidism, ?dementia (patient on donepezil and rivastigmine in past) presenting with 4 days of non-productive cough and shortness of breath. Of note, patient poor historian. Patient endorses subjective fevers, for which she took tylenol with some symptomatic relief, as well as sore throat a/w her cough. Patient also endorses left sided chest pain that was present prior to this week. She denies N/V/D, abdominal pain, headache. Patient received flu vaccine this year.    In the ED, VSS, ECG NSR, CXR clear, CT chest showing non-specific pulmonary nodule. RVP was Influenza A positive and lactate 2.1. Patient given 2 boluses of NS, and one dose of ceftriaxone and azithromycin. 78F with PMH of HTN, HLD, hypothyroidism, hearing loss, (patient on donepezil and rivastigmine in past) presenting with 4 days of non-productive cough and shortness of breath. Of note, patient poor historian. Patient endorses subjective fevers, for which she took tylenol with some symptomatic relief, as well as sore throat a/w her cough. Patient also endorses left sided chest pain that was present prior to this week. She denies N/V/D, abdominal pain, headache. Patient received flu vaccine this year.    In the ED, VSS, ECG NSR, CXR clear, CT chest showing non-specific pulmonary nodule. RVP was Influenza A positive and lactate 2.1. Patient given 2 boluses of NS, and one dose of ceftriaxone and azithromycin.

## 2019-03-26 LAB
ANION GAP SERPL CALC-SCNC: 15 MMOL/L — SIGNIFICANT CHANGE UP (ref 5–17)
BUN SERPL-MCNC: 16 MG/DL — SIGNIFICANT CHANGE UP (ref 7–23)
CALCIUM SERPL-MCNC: 8.7 MG/DL — SIGNIFICANT CHANGE UP (ref 8.4–10.5)
CHLORIDE SERPL-SCNC: 103 MMOL/L — SIGNIFICANT CHANGE UP (ref 96–108)
CO2 SERPL-SCNC: 27 MMOL/L — SIGNIFICANT CHANGE UP (ref 22–31)
CREAT SERPL-MCNC: 0.61 MG/DL — SIGNIFICANT CHANGE UP (ref 0.5–1.3)
GLUCOSE SERPL-MCNC: 130 MG/DL — HIGH (ref 70–99)
POTASSIUM SERPL-MCNC: 4.1 MMOL/L — SIGNIFICANT CHANGE UP (ref 3.5–5.3)
POTASSIUM SERPL-SCNC: 4.1 MMOL/L — SIGNIFICANT CHANGE UP (ref 3.5–5.3)
SODIUM SERPL-SCNC: 145 MMOL/L — SIGNIFICANT CHANGE UP (ref 135–145)

## 2019-03-26 PROCEDURE — 99232 SBSQ HOSP IP/OBS MODERATE 35: CPT | Mod: GC

## 2019-03-26 RX ORDER — LOSARTAN POTASSIUM 100 MG/1
25 TABLET, FILM COATED ORAL DAILY
Qty: 0 | Refills: 0 | Status: DISCONTINUED | OUTPATIENT
Start: 2019-03-26 | End: 2019-03-28

## 2019-03-26 RX ORDER — LOSARTAN/HYDROCHLOROTHIAZIDE 100MG-25MG
1 TABLET ORAL
Qty: 0 | Refills: 0 | COMMUNITY

## 2019-03-26 RX ORDER — LEVOTHYROXINE SODIUM 125 MCG
1 TABLET ORAL
Qty: 0 | Refills: 0 | COMMUNITY

## 2019-03-26 RX ORDER — TRAMADOL HYDROCHLORIDE 50 MG/1
1 TABLET ORAL
Qty: 0 | Refills: 0 | COMMUNITY

## 2019-03-26 RX ORDER — ALBUTEROL 90 UG/1
2 AEROSOL, METERED ORAL
Qty: 0 | Refills: 0 | COMMUNITY

## 2019-03-26 RX ORDER — ASPIRIN/CALCIUM CARB/MAGNESIUM 324 MG
1 TABLET ORAL
Qty: 0 | Refills: 0 | COMMUNITY

## 2019-03-26 RX ADMIN — Medication 20 MILLIGRAM(S): at 13:18

## 2019-03-26 RX ADMIN — Medication 0.5 MILLIGRAM(S): at 05:52

## 2019-03-26 RX ADMIN — ATORVASTATIN CALCIUM 10 MILLIGRAM(S): 80 TABLET, FILM COATED ORAL at 23:02

## 2019-03-26 RX ADMIN — LOSARTAN POTASSIUM 25 MILLIGRAM(S): 100 TABLET, FILM COATED ORAL at 13:19

## 2019-03-26 RX ADMIN — Medication 0.5 MILLIGRAM(S): at 23:02

## 2019-03-26 RX ADMIN — Medication 75 MICROGRAM(S): at 05:52

## 2019-03-26 NOTE — PROGRESS NOTE ADULT - SUBJECTIVE AND OBJECTIVE BOX
Handy Jena, PGY-1  Internal Medicine  Pager:  [phone]    Patient is a 78y old  Female who presents with a chief complaint of Flu (25 Mar 2019 07:41)      SUBJECTIVE / OVERNIGHT EVENTS:   Patient seen and examined at bedside. No acute events overnight.     MEDICATIONS  (STANDING):  ALBUTerol    90 MICROgram(s) HFA Inhaler 1 Puff(s) Inhalation every 4 hours  atorvastatin 10 milliGRAM(s) Oral at bedtime  buDESOnide   0.5 milliGRAM(s) Respule 0.5 milliGRAM(s) Inhalation two times a day  levothyroxine 75 MICROGram(s) Oral daily  sodium chloride 0.9%. 1000 milliLiter(s) (75 mL/Hr) IV Continuous <Continuous>  tiotropium 18 MICROgram(s) Capsule 1 Capsule(s) Inhalation daily    MEDICATIONS  (PRN):  acetaminophen   Tablet .. 650 milliGRAM(s) Oral every 6 hours PRN Temp greater or equal to 38C (100.4F), Mild Pain (1 - 3), Moderate Pain (4 - 6)      CAPILLARY BLOOD GLUCOSE          I&O's Summary      T(C): 36.9 (03-26-19 @ 07:48), Max: 36.9 (03-25-19 @ 16:47)  HR: 98 (03-26-19 @ 07:48) (68 - 98)  BP: 133/76 (03-26-19 @ 07:48) (129/75 - 151/80)  RR: 20 (03-26-19 @ 07:48) (17 - 20)  SpO2: 97% (03-26-19 @ 07:48) (95% - 100%)    PHYSICAL EXAM:  GENERAL: NAD, well-developed  HEAD:  Atraumatic, Normocephalic  EYES: PERRL. EOMI,  conjunctiva and sclera clear.  NECK: Supple  CHEST/LUNG: Scattered wheezes  HEART: Regular rate and rhythm. Normal sounding S1, S2. No murmurs, rubs, or gallops  ABDOMEN: Soft, nontender, nondistended; Bowel sounds present.  EXTREMITIES:  No clubbing, cyanosis, or edema. Peripheral pulses 2+ and symmetric.  PSYCH: AAOx3  NEUROLOGY: non-focal  SKIN: No rashes or lesions    LABS:                        13.5   3.9   )-----------( 141      ( 24 Mar 2019 22:15 )             42.6     WBC Trend: 3.9<--  03-26    145  |  103  |  16  ----------------------------<  130<H>  4.1   |  27  |  0.61    Ca    8.7      26 Mar 2019 07:27  Mg     2.3     03-24    TPro  7.0  /  Alb  3.5  /  TBili  0.1<L>  /  DBili  x   /  AST  75<H>  /  ALT  27  /  AlkPhos  55  03-24    Creatinine Trend: 0.61<--, 1.56<--, 1.60<--  PT/INR - ( 24 Mar 2019 22:15 )   PT: 10.1 sec;   INR: 0.88 ratio         PTT - ( 24 Mar 2019 22:15 )  PTT:31.3 sec          RADIOLOGY & ADDITIONAL TESTS:    Imaging Personally Reviewed:    Consultant(s) Notes Reviewed:      Care Discussed with Consultants/Other Providers:

## 2019-03-26 NOTE — PROGRESS NOTE ADULT - PROBLEM SELECTOR PLAN 1
Patient with 4 days of non-productive cough and shortness of breath, found to be Influenza A positive. No leukocytosis, but lactate of 2.1. Patient outside window for tamiflu.  -CXR/CT chest clear  - Duonebs, inhaled and systemic steroid short course  - Droplet isolation  - Tylenol prn

## 2019-03-26 NOTE — PROGRESS NOTE ADULT - PROBLEM SELECTOR PLAN 2
RESOLVED  Patient with Cr of 1.6 on admission with unclear baseline -- down to 0.61 today  - IVF   - Strict I/Os  - Renally dose nephrotoxic medications

## 2019-03-26 NOTE — PROGRESS NOTE ADULT - ATTENDING COMMENTS
Seen, examined the patient  Resting in bed, c/o cough and wheeze with SOB, low appetitie  - Reviewed labs, imaging    Resolved MARIANNA on IV hydration, will resume Losartan 25mg/d  - Bronchodilators, inhaled and systemic sterois short course  - Echo for atypical chest pain, EKG, Simone are normal  - PT ordered  ** spoke to family at bedside

## 2019-03-26 NOTE — PROGRESS NOTE ADULT - PROBLEM SELECTOR PLAN 3
Patient with h/o HTN on home HCTZ-losartan   - Hold for now in setting of possible MARIANNA Patient with h/o HTN on home HCTZ-losartan   - Restart losartan 25

## 2019-03-27 ENCOUNTER — TRANSCRIPTION ENCOUNTER (OUTPATIENT)
Age: 79
End: 2019-03-27

## 2019-03-27 PROCEDURE — 99232 SBSQ HOSP IP/OBS MODERATE 35: CPT

## 2019-03-27 RX ADMIN — LOSARTAN POTASSIUM 25 MILLIGRAM(S): 100 TABLET, FILM COATED ORAL at 05:47

## 2019-03-27 RX ADMIN — ATORVASTATIN CALCIUM 10 MILLIGRAM(S): 80 TABLET, FILM COATED ORAL at 21:46

## 2019-03-27 RX ADMIN — Medication 75 MICROGRAM(S): at 05:47

## 2019-03-27 RX ADMIN — Medication 20 MILLIGRAM(S): at 11:36

## 2019-03-27 RX ADMIN — Medication 0.5 MILLIGRAM(S): at 05:47

## 2019-03-27 RX ADMIN — Medication 20 MILLIGRAM(S): at 05:47

## 2019-03-27 RX ADMIN — Medication 0.5 MILLIGRAM(S): at 16:55

## 2019-03-27 RX ADMIN — Medication 20 MILLIGRAM(S): at 21:46

## 2019-03-27 NOTE — DISCHARGE NOTE PROVIDER - HOSPITAL COURSE
HPI:    78F with PMH of HTN, HLD, hypothyroidism, hearing loss, (patient on donepezil and rivastigmine in past) presenting with 4 days of non-productive cough and shortness of breath. Of note, patient poor historian. Patient endorses subjective fevers, for which she took tylenol with some symptomatic relief, as well as sore throat a/w her cough. Patient also endorses left sided chest pain that was present prior to this week. She denies N/V/D, abdominal pain, headache. Patient received flu vaccine this year.        In the ED, VSS, ECG NSR, CXR clear, CT chest showing non-specific pulmonary nodule. RVP was Influenza A positive and lactate 2.1. Patient given 2 boluses of NS, and one dose of ceftriaxone and azithromycin. (25 Mar 2019 07:41)        Hospital Course:     Patient given IV fluids for MARIANNA with resolution of kidney function. Patient still wheezing, given prednisone and will need total of 5 day course. Stable for discharge.

## 2019-03-27 NOTE — DISCHARGE NOTE PROVIDER - NSDCHC_MEDRECSTATUS_GEN_ALL_CORE
Admission Reconciliation is Not Complete  Discharge Reconciliation is Not Complete Admission Reconciliation is Not Complete  Discharge Reconciliation is Completed Admission Reconciliation is Completed  Discharge Reconciliation is Completed

## 2019-03-27 NOTE — PROGRESS NOTE ADULT - SUBJECTIVE AND OBJECTIVE BOX
Handy Jean, PGY-1  Internal Medicine  Pager:  [phone]    Patient is a 78y old  Female who presents with a chief complaint of Flu (26 Mar 2019 09:12)      SUBJECTIVE / OVERNIGHT EVENTS:   Patient seen and examined at bedside. No acute events overnight.     MEDICATIONS  (STANDING):  ALBUTerol    90 MICROgram(s) HFA Inhaler 1 Puff(s) Inhalation every 4 hours  atorvastatin 10 milliGRAM(s) Oral at bedtime  buDESOnide   0.5 milliGRAM(s) Respule 0.5 milliGRAM(s) Inhalation two times a day  levothyroxine 75 MICROGram(s) Oral daily  losartan 25 milliGRAM(s) Oral daily  predniSONE   Tablet 20 milliGRAM(s) Oral daily  sodium chloride 0.9%. 1000 milliLiter(s) (75 mL/Hr) IV Continuous <Continuous>  tiotropium 18 MICROgram(s) Capsule 1 Capsule(s) Inhalation daily    MEDICATIONS  (PRN):  acetaminophen   Tablet .. 650 milliGRAM(s) Oral every 6 hours PRN Temp greater or equal to 38C (100.4F), Mild Pain (1 - 3), Moderate Pain (4 - 6)      CAPILLARY BLOOD GLUCOSE          I&O's Summary    26 Mar 2019 07:01  -  27 Mar 2019 07:00  --------------------------------------------------------  IN: 280 mL / OUT: 0 mL / NET: 280 mL        T(C): 36.9 (03-26-19 @ 18:36), Max: 36.9 (03-26-19 @ 07:48)  HR: 81 (03-27-19 @ 05:34) (79 - 98)  BP: 132/74 (03-27-19 @ 05:34) (132/74 - 145/84)  RR: 22 (03-26-19 @ 18:36) (20 - 22)  SpO2: 100% (03-26-19 @ 18:36) (95% - 100%)    PHYSICAL EXAM:  GENERAL: NAD, well-developed  HEAD:  Atraumatic, Normocephalic  EYES: PERRL. EOMI,  conjunctiva and sclera clear.  NECK: Supple  CHEST/LUNG: Scattered wheezes  HEART: Regular rate and rhythm. Normal sounding S1, S2. No murmurs, rubs, or gallops  ABDOMEN: Soft, nontender, nondistended; Bowel sounds present.  EXTREMITIES:  No clubbing, cyanosis, or edema. Peripheral pulses 2+ and symmetric.  PSYCH: AAOx3  NEUROLOGY: non-focal  SKIN: No rashes or lesions    LABS:    WBC Trend: 3.9<--  03-26    145  |  103  |  16  ----------------------------<  130<H>  4.1   |  27  |  0.61    Ca    8.7      26 Mar 2019 07:27      Creatinine Trend: 0.61<--, 1.56<--, 1.60<--            RADIOLOGY & ADDITIONAL TESTS:    Imaging Personally Reviewed:    Consultant(s) Notes Reviewed:      Care Discussed with Consultants/Other Providers:

## 2019-03-27 NOTE — PROGRESS NOTE ADULT - ATTENDING COMMENTS
Seen, examined the patient  Sitting in chair, family at bedside, c/o cough and wheeze with SOB, improving slowly  - Reviewed labs, imaging    Resolved MARIANNA on IV hydration, will resume Losartan 25mg/d  - Bronchodilators, inhaled and systemic steroid short course  - Echo for atypical chest pain, EKG, Simone are normal  - PT in progress, d/c planning tomorrow  ** spoke to family at bedside.

## 2019-03-27 NOTE — DISCHARGE NOTE PROVIDER - CARE PROVIDER_API CALL
Patti Villanueva)  [place] [address]  Phone: [phone]  Fax: [phone]  Follow Up Time:

## 2019-03-27 NOTE — PHYSICAL THERAPY INITIAL EVALUATION ADULT - CRITERIA FOR SKILLED THERAPEUTIC INTERVENTIONS
therapy frequency/impairments found/risk reduction/prevention/predicted duration of therapy intervention/anticipated discharge recommendation

## 2019-03-27 NOTE — PHYSICAL THERAPY INITIAL EVALUATION ADULT - ADDITIONAL COMMENTS
Pt lives in a two family house alone w/ 6 steps to neg to enter. Pt's DTR lives upstairs. Pt amb w/ RW and SC PTA. Pt was independent w/ ADL's.

## 2019-03-27 NOTE — PHYSICAL THERAPY INITIAL EVALUATION ADULT - GAIT TRAINING, PT EVAL
GOAL: Patient will ambulate 300 feet independently with RW in 1 week. Pt will be able to neg 6 steps w/ 1 HR and SC and sup in 1 week.

## 2019-03-27 NOTE — DISCHARGE NOTE PROVIDER - NSDCCPCAREPLAN_GEN_ALL_CORE_FT
PRINCIPAL DISCHARGE DIAGNOSIS  Diagnosis: Influenza  Assessment and Plan of Treatment: You were admitted with the flu. Follow up with your PCP and continue taking your prednisone as needed.      SECONDARY DISCHARGE DIAGNOSES  Diagnosis: MARIANNA (acute kidney injury)  Assessment and Plan of Treatment: You had an acute kidney injury, likely caused by dehydration. This resolved with fluids. Please continue to stay hydrated. PRINCIPAL DISCHARGE DIAGNOSIS  Diagnosis: Influenza  Assessment and Plan of Treatment: You were admitted with the flu. Follow up with your PCP and continue taking your prednisone as needed.      SECONDARY DISCHARGE DIAGNOSES  Diagnosis: MARIANNA (acute kidney injury)  Assessment and Plan of Treatment: You had an acute kidney injury, likely caused by dehydration. This resolved with fluids. Please continue to stay hydrated.    Diagnosis: Chest pain  Assessment and Plan of Treatment: You mentioned that you had some chest pain. We checked your EKG and cardiac enzymes which were all normal. Please follow up with a cardiologist within 1-2 weeks for an echocardiogram

## 2019-03-28 ENCOUNTER — TRANSCRIPTION ENCOUNTER (OUTPATIENT)
Age: 79
End: 2019-03-28

## 2019-03-28 VITALS
RESPIRATION RATE: 18 BRPM | HEART RATE: 83 BPM | TEMPERATURE: 98 F | OXYGEN SATURATION: 97 % | SYSTOLIC BLOOD PRESSURE: 153 MMHG | DIASTOLIC BLOOD PRESSURE: 86 MMHG

## 2019-03-28 PROCEDURE — 83605 ASSAY OF LACTIC ACID: CPT

## 2019-03-28 PROCEDURE — 71250 CT THORAX DX C-: CPT

## 2019-03-28 PROCEDURE — 84145 PROCALCITONIN (PCT): CPT

## 2019-03-28 PROCEDURE — 83880 ASSAY OF NATRIURETIC PEPTIDE: CPT

## 2019-03-28 PROCEDURE — 85014 HEMATOCRIT: CPT

## 2019-03-28 PROCEDURE — 85730 THROMBOPLASTIN TIME PARTIAL: CPT

## 2019-03-28 PROCEDURE — 82803 BLOOD GASES ANY COMBINATION: CPT

## 2019-03-28 PROCEDURE — 87633 RESP VIRUS 12-25 TARGETS: CPT

## 2019-03-28 PROCEDURE — 87486 CHLMYD PNEUM DNA AMP PROBE: CPT

## 2019-03-28 PROCEDURE — 96374 THER/PROPH/DIAG INJ IV PUSH: CPT

## 2019-03-28 PROCEDURE — 82435 ASSAY OF BLOOD CHLORIDE: CPT

## 2019-03-28 PROCEDURE — 80048 BASIC METABOLIC PNL TOTAL CA: CPT

## 2019-03-28 PROCEDURE — 80053 COMPREHEN METABOLIC PANEL: CPT

## 2019-03-28 PROCEDURE — 93005 ELECTROCARDIOGRAM TRACING: CPT

## 2019-03-28 PROCEDURE — 84295 ASSAY OF SERUM SODIUM: CPT

## 2019-03-28 PROCEDURE — 82565 ASSAY OF CREATININE: CPT

## 2019-03-28 PROCEDURE — 94640 AIRWAY INHALATION TREATMENT: CPT

## 2019-03-28 PROCEDURE — 87798 DETECT AGENT NOS DNA AMP: CPT

## 2019-03-28 PROCEDURE — 99239 HOSP IP/OBS DSCHRG MGMT >30: CPT

## 2019-03-28 PROCEDURE — 85610 PROTHROMBIN TIME: CPT

## 2019-03-28 PROCEDURE — 87040 BLOOD CULTURE FOR BACTERIA: CPT

## 2019-03-28 PROCEDURE — 83735 ASSAY OF MAGNESIUM: CPT

## 2019-03-28 PROCEDURE — 71046 X-RAY EXAM CHEST 2 VIEWS: CPT

## 2019-03-28 PROCEDURE — 84132 ASSAY OF SERUM POTASSIUM: CPT

## 2019-03-28 PROCEDURE — 82947 ASSAY GLUCOSE BLOOD QUANT: CPT

## 2019-03-28 PROCEDURE — 82330 ASSAY OF CALCIUM: CPT

## 2019-03-28 PROCEDURE — 85027 COMPLETE CBC AUTOMATED: CPT

## 2019-03-28 PROCEDURE — 87581 M.PNEUMON DNA AMP PROBE: CPT

## 2019-03-28 PROCEDURE — 97161 PT EVAL LOW COMPLEX 20 MIN: CPT

## 2019-03-28 PROCEDURE — 84484 ASSAY OF TROPONIN QUANT: CPT

## 2019-03-28 PROCEDURE — 99285 EMERGENCY DEPT VISIT HI MDM: CPT | Mod: 25

## 2019-03-28 RX ADMIN — Medication 20 MILLIGRAM(S): at 13:17

## 2019-03-28 RX ADMIN — Medication 0.5 MILLIGRAM(S): at 05:51

## 2019-03-28 RX ADMIN — Medication 20 MILLIGRAM(S): at 05:52

## 2019-03-28 RX ADMIN — Medication 75 MICROGRAM(S): at 05:51

## 2019-03-28 RX ADMIN — LOSARTAN POTASSIUM 25 MILLIGRAM(S): 100 TABLET, FILM COATED ORAL at 05:51

## 2019-03-28 NOTE — PROGRESS NOTE ADULT - ATTENDING COMMENTS
Seen, examined the patient  - sitting in chair, mild scattered exp wheeze but improving, afebrile, wants to go home  - would c/w short course of oral prednisone total 5 days, bronchodilators, antitussive  - Outpatient Echo, no EKG changes for ischemic, Simone are normal  - d/c home today.   ** Family is at bedside, aware of plan of care    discharge time- 37 min

## 2019-03-28 NOTE — DISCHARGE NOTE NURSING/CASE MANAGEMENT/SOCIAL WORK - NSDCDPATPORTLINK_GEN_ALL_CORE
You can access the [facility] Patient Portal, offered by [facility], by registering with the following website: [email or web address]

## 2019-03-30 LAB
CULTURE RESULTS: SIGNIFICANT CHANGE UP
CULTURE RESULTS: SIGNIFICANT CHANGE UP
SPECIMEN SOURCE: SIGNIFICANT CHANGE UP
SPECIMEN SOURCE: SIGNIFICANT CHANGE UP

## 2019-04-01 ENCOUNTER — OUTPATIENT (OUTPATIENT)
Dept: OUTPATIENT SERVICES | Facility: HOSPITAL | Age: 79
LOS: 1 days | End: 2019-04-01
Payer: MEDICARE

## 2019-04-01 PROCEDURE — G9001: CPT

## 2019-04-04 DIAGNOSIS — Z71.89 OTHER SPECIFIED COUNSELING: ICD-10-CM

## 2019-04-04 PROBLEM — E03.9 HYPOTHYROIDISM, UNSPECIFIED: Chronic | Status: ACTIVE | Noted: 2019-03-25

## 2019-04-04 PROBLEM — E78.5 HYPERLIPIDEMIA, UNSPECIFIED: Chronic | Status: ACTIVE | Noted: 2019-03-24

## 2019-04-04 PROBLEM — I10 ESSENTIAL (PRIMARY) HYPERTENSION: Chronic | Status: ACTIVE | Noted: 2019-03-24

## 2020-05-31 NOTE — PATIENT PROFILE ADULT. - NS TRANSFER EYEGLASSES PAIRS
Post Op Day 2 Ortho Note:     RRT called for pt this morning d/t tachycardia, tachypnea, and hypoxia. Pulmonology service ordered CTA chest to r/o PE; pt transferred to [facility].      Assessment  Assessed patient in bed after CTA completed; venous doppler US o Eloy Castro 1 pair

## 2020-11-24 NOTE — H&P ADULT - NSICDXPASTMEDICALHX_GEN_ALL_CORE_FT
Awaiting office visit notes from provider. Routed to providers. PAST MEDICAL HISTORY:  HLD (hyperlipidemia)     HTN (hypertension)     Hypothyroidism

## 2023-01-13 NOTE — ED ADULT NURSE NOTE - CAS TRG GEN SKIN CONDITION
Pt  at 1335. Family present at bedside. House NP & hospitalist notified. Pt qualified for ME case - spoke w/ maida Rico to remove lines & drains when appropriate.    Warm/Dry

## 2023-04-17 NOTE — PATIENT PROFILE ADULT. - URINARY CATHETER
Home Care Instructions Following Esophageal Manometry    Diet:  Resume your regular diet as tolerated unless otherwise instructed. Medication:  If you have questions about resuming your normal medications, please contact your Primary Care Physician. Activities: You may resume your normal daily activities. What to Expect:  Throat discomfort  Runny/congested nose  Minor nose bleed    Contact Your Doctor If:  Active nose bleeding  Significant pain    **If unable to reach your doctor, please go to the [facility] Emergency Room**    - Your referring physician will receive a full report of your examination.  - If you do not hear from your doctor's office within two weeks of your procedure, please call them for your results. You may be able to see your laboratory results in [facility] between 4 and 7 business days. In some cases, your physician may not have viewed the results before they are released to [address] If you have questions regarding your results contact the physician who ordered the test/exam by phone or via [address] by choosing \"Ask a Medical Question. \"
no

## 2023-06-27 NOTE — PATIENT PROFILE ADULT - LIVES WITH
From: Armond Duval  To: Lisa Hanson  Sent: 6/27/2023 10:07 AM CDT  Subject: Confirming Friday    This message is being sent by Jacki Duval on behalf of Armond Duval.    Just confirming I got a reminder that Armond has an appointment on Friday at 9am but I didn't have that in my calendar (which I could have forgotten) we are able to make it but I just wanted to make sure that is correct.     Thanks!    adult child(vicenta)

## 2025-03-03 NOTE — H&P ADULT. - SKIN
[de-identified] : [facility] [facility] detailed exam color normal/warm and dry